# Patient Record
Sex: FEMALE | Race: WHITE | Employment: FULL TIME | URBAN - METROPOLITAN AREA
[De-identification: names, ages, dates, MRNs, and addresses within clinical notes are randomized per-mention and may not be internally consistent; named-entity substitution may affect disease eponyms.]

---

## 2023-12-05 ENCOUNTER — APPOINTMENT (OUTPATIENT)
Dept: ORTHOPEDIC SURGERY | Facility: CLINIC | Age: 28
End: 2023-12-05

## 2023-12-26 ENCOUNTER — ANCILLARY PROCEDURE (OUTPATIENT)
Dept: RADIOLOGY | Facility: CLINIC | Age: 28
End: 2023-12-26
Payer: COMMERCIAL

## 2023-12-26 ENCOUNTER — DOCUMENTATION (OUTPATIENT)
Dept: ORTHOPEDIC SURGERY | Facility: CLINIC | Age: 28
End: 2023-12-26

## 2023-12-26 ENCOUNTER — OFFICE VISIT (OUTPATIENT)
Dept: ORTHOPEDIC SURGERY | Facility: CLINIC | Age: 28
End: 2023-12-26
Payer: COMMERCIAL

## 2023-12-26 VITALS — HEIGHT: 69 IN | BODY MASS INDEX: 21.48 KG/M2 | WEIGHT: 145 LBS

## 2023-12-26 DIAGNOSIS — M25.562 LEFT KNEE PAIN, UNSPECIFIED CHRONICITY: ICD-10-CM

## 2023-12-26 DIAGNOSIS — S83.282A ACUTE LATERAL MENISCUS TEAR OF LEFT KNEE, INITIAL ENCOUNTER: ICD-10-CM

## 2023-12-26 PROBLEM — F41.1 GAD (GENERALIZED ANXIETY DISORDER): Status: ACTIVE | Noted: 2021-02-22

## 2023-12-26 PROBLEM — S52.509A CLOSED FRACTURE OF DISTAL END OF RADIUS: Status: ACTIVE | Noted: 2019-08-23

## 2023-12-26 PROCEDURE — 73564 X-RAY EXAM KNEE 4 OR MORE: CPT | Mod: LEFT SIDE | Performed by: RADIOLOGY

## 2023-12-26 PROCEDURE — 99203 OFFICE O/P NEW LOW 30 MIN: CPT | Performed by: ORTHOPAEDIC SURGERY

## 2023-12-26 PROCEDURE — 99213 OFFICE O/P EST LOW 20 MIN: CPT | Performed by: ORTHOPAEDIC SURGERY

## 2023-12-26 PROCEDURE — 73564 X-RAY EXAM KNEE 4 OR MORE: CPT | Mod: LT

## 2023-12-26 RX ORDER — CHOLECALCIFEROL (VITAMIN D3) 50 MCG
2000 TABLET ORAL
COMMUNITY

## 2023-12-26 NOTE — LETTER
December 26, 2023     Patient: Radha Daniel   YOB: 1995   Date of Visit: 12/26/2023       To Whom It May Concern:    Radha Daniel was seen in my clinic on 12/26/2023 at ?. Please excuse Radha for her absence from work on this day to make the appointment. She may return to activity 1/31/23, with no lunges, deep squats, extreme knee flexion, twisting of the knee. Restrictions remain until seen after follow up visit.    If you have any questions or concerns, please don't hesitate to call.         Sincerely,       Yoel Cruz MD

## 2023-12-26 NOTE — PROGRESS NOTES
History of Present Illness   The patient presents today endorsing left knee pain. The pain localizes over the lateral joint line.  The patient denies any recent trauma and notes the insidious onset of pain.  The pain is achy, constant, worse with activity and better with rest. The patient notes occasional locking, catching and giving out.  The patient has tried the following modalities rest, ice, anti-inflammatories, physical therapy, Occupational Therapy, working with personal trainers as well as knee braces.  She notes the pain bothers her specifically with deep flexion, lunges, squats, notes catching, locking and giving out on a fairly regular basis that is progressively increasing over the past several weeks.    Patient currently works as a  and professional ballet dancer, is very physically active.    History reviewed. No pertinent past medical history.  History reviewed. No pertinent surgical history.    Current Outpatient Medications:     Bacillus subtilis-inulin 1.5 billion cell-1 gram tablet,chewable, , Disp: , Rfl:     cholecalciferol (Vitamin D-3) 50 MCG (2000 UT) tablet, Take 1 tablet (2,000 Units) by mouth once daily., Disp: , Rfl:     Review of Systems   GENERAL: Negative for malaise, significant weight loss, fever  MUSCULOSKELETAL: See HPI  NEURO:  Negative for numbness / tingling     Physical Examination:  Left Knee:  Skin healthy and intact  No gross swelling or ecchymosis  No significant varus or valgus malalignment  Effusion: absent    Range of motion: full  No pain with internal rotation of the hip  Tenderness to palpation:  lateral joint line     No laxity to valgus stress  No laxity to varus stress  Negative Lachman´s test  Negative anterior drawer test  Negative posterior drawer test  Positive Yonas´s test  Neurovascular exam normal distally    Imaging:  Plain films of the left knee reveal no fractures or dislocation, good alignment and position, there is mild patella  addy, no degenerative change noted, no loose bodies noted.    Assessment:    Patient with left knee pain concern for meniscal tear      Plan:  We discussed with the patient concern for a meniscal tear.   We reviewed the natural history of meniscal pathology and discussed the implications for the health of the joint.  Various treatment options were discussed and the patient elected for further evaluation with MRI.  Discussed continuation of home exercise program, stretching, keeping the knee moving.  We also discussed anti-inflammatories that she is taking over-the-counter.  We will call her back with results and discuss treatment options.    Luis Daniel Cobos PA-C    In a face to face encounter, I evaluated the patient and performed a physical examination, discussed pertinent diagnostic studies if indicated and discussed diagnosis and management strategies with both the patient and physician assistant / nurse practitioner.  I reviewed the PA/NP's note and agree with the documented findings and plan of care.        Yoel Cruz MD

## 2024-01-09 ENCOUNTER — OFFICE VISIT (OUTPATIENT)
Dept: ORTHOPEDIC SURGERY | Facility: CLINIC | Age: 29
End: 2024-01-09
Payer: MEDICARE

## 2024-01-09 DIAGNOSIS — S83.282A ACUTE LATERAL MENISCUS TEAR OF LEFT KNEE, INITIAL ENCOUNTER: ICD-10-CM

## 2024-01-09 DIAGNOSIS — M25.562 LEFT KNEE PAIN, UNSPECIFIED CHRONICITY: ICD-10-CM

## 2024-01-09 DIAGNOSIS — M22.8X2 PATELLAR TRACKING DISORDER, LEFT: ICD-10-CM

## 2024-01-09 PROCEDURE — 99213 OFFICE O/P EST LOW 20 MIN: CPT | Performed by: ORTHOPAEDIC SURGERY

## 2024-01-09 NOTE — PROGRESS NOTES
History of Present Illness:   Patient returns today endorsing similar symptoms in the knee but denies any significant issues or problems at rest.  She is interested in getting back to dance denies any recent injuries.  Here today to review her MRI.    Review of Systems   GENERAL: Negative for malaise, significant weight loss, fever  MUSCULOSKELETAL: see HPI  NEURO:  Negative    Physical Examination:  Left knee:  Skin healthy and intact  No gross swelling or ecchymosis  No varus malalignment  No valgus malalignment   No effusion  ROM:  Full flexion   Full extension  No pain with internal rotation of the hip     No tenderness to palpation over medial joint line  No tenderness to palpation over lateral joint line  There is tenderness to palpation over anterior knee with patellar compression  No laxity to valgus stress  No laxity to varus stress  Negative Lachman´s test  Negative anterior drawer test  Negative posterior drawer test  Mild positive Yonas´s test     Neurovascular exam normal distally    Imaging:  MRI of the left knee reveals lateral patellar maltracking with chondral wear on the lateral facet of patella and lateral trochlea, mild to moderate in this area.  Otherwise normal MRI findings without evidence of internal derangement    Assessment:   Patient with left knee pain, lateral maltracking of patella    Plan:  We encourage patient participate in physical therapy for strengthening of the medial quadriceps group in an attempt to offset lateral patellar maltracking.  We also discussed long-term options if physical therapy would fail, such as anti-inflammatories and corticosteroid injections along with arthroscopic lateral retinacular release.  Patient agreeable to physical therapy at this point time.    Luis Daniel Cobos PA-C    In a face to face encounter, I evaluated the patient and performed a physical examination, discussed pertinent diagnostic studies if indicated and discussed diagnosis and  management strategies with both the patient and physician assistant / nurse practitioner.  I reviewed the PA/NP's note and agree with the documented findings and plan of care.    We discussed with the patient she does have evidence of patellar maltracking and friction syndrome.  We discussed targeted VMO strengthening for patellar tracking with failure to improve could consider corticosteroid injection or arthroscopic lateral release to help with patellar compression syndrome/lateral compartment overload.    Yoel Cruz MD